# Patient Record
(demographics unavailable — no encounter records)

---
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
## 2025-07-10 NOTE — HISTORY OF PRESENT ILLNESS
[Postpartum Follow Up] : postpartum follow up [Delivery Date: ___] : on [unfilled] [None] : No associated symptoms are reported [FreeTextEntry9] : IUFD at 22wks> D&E with Dr. Trejo at [facility] on 7/3/25. Genetics pending. Has followup with Dr. Trejo on 7/29th.  [de-identified] : Called to ask for birth control- OCPs. Did not like 90 day OCPs.  Has been in touch with endocrinologist- synthroid dose adjusted to 75mcg. Will do bloodwork and they will adjust meds accordingly.  [de-identified] : Mild lochia. Well supported by family, community [de-identified] : deferred [de-identified] : Post D&D for 2nd trimester fetal demise [de-identified] : Will Rx OCP to start at earliest 3 wks pp 7/24/25. Keep appt with Dr. Trejo/ [facility].